# Patient Record
Sex: MALE | Race: WHITE | HISPANIC OR LATINO
[De-identification: names, ages, dates, MRNs, and addresses within clinical notes are randomized per-mention and may not be internally consistent; named-entity substitution may affect disease eponyms.]

---

## 2020-07-05 ENCOUNTER — TRANSCRIPTION ENCOUNTER (OUTPATIENT)
Age: 50
End: 2020-07-05

## 2022-12-01 ENCOUNTER — RX ONLY (RX ONLY)
Age: 52
End: 2022-12-01

## 2022-12-01 ENCOUNTER — OFFICE (OUTPATIENT)
Dept: URBAN - METROPOLITAN AREA CLINIC 28 | Facility: CLINIC | Age: 52
Setting detail: OPHTHALMOLOGY
End: 2022-12-01
Payer: COMMERCIAL

## 2022-12-01 DIAGNOSIS — H00.12: ICD-10-CM

## 2022-12-01 DIAGNOSIS — H16.221: ICD-10-CM

## 2022-12-01 PROCEDURE — 92012 INTRM OPH EXAM EST PATIENT: CPT | Performed by: OPHTHALMOLOGY

## 2022-12-01 ASSESSMENT — KERATOMETRY
OD_AXISANGLE_DEGREES: 087
OS_AXISANGLE_DEGREES: 168
OD_K1POWER_DIOPTERS: 42.50
OS_K1POWER_DIOPTERS: 43.00
OD_K2POWER_DIOPTERS: 43.50
OS_K2POWER_DIOPTERS: 43.50

## 2022-12-01 ASSESSMENT — SPHEQUIV_DERIVED
OD_SPHEQUIV: -0.625
OS_SPHEQUIV: -0.375

## 2022-12-01 ASSESSMENT — REFRACTION_AUTOREFRACTION
OS_AXIS: 071
OD_CYLINDER: -0.25
OD_AXIS: 163
OD_SPHERE: -0.50
OS_CYLINDER: -0.75
OS_SPHERE: 0.00

## 2022-12-01 ASSESSMENT — AXIALLENGTH_DERIVED
OS_AL: 23.8323
OD_AL: 24.0269

## 2022-12-01 ASSESSMENT — VISUAL ACUITY
OD_BCVA: 20/25
OS_BCVA: 20/20-1

## 2022-12-01 ASSESSMENT — LID EXAM ASSESSMENTS
OS_MEIBOMITIS: 1+
OD_MEIBOMITIS: 1+

## 2022-12-01 ASSESSMENT — TONOMETRY
OD_IOP_MMHG: 15
OS_IOP_MMHG: 18

## 2022-12-01 ASSESSMENT — SUPERFICIAL PUNCTATE KERATITIS (SPK): OD_SPK: T

## 2024-05-29 ENCOUNTER — APPOINTMENT (OUTPATIENT)
Dept: ORTHOPEDIC SURGERY | Facility: CLINIC | Age: 54
End: 2024-05-29
Payer: COMMERCIAL

## 2024-05-29 DIAGNOSIS — M75.41 IMPINGEMENT SYNDROME OF RIGHT SHOULDER: ICD-10-CM

## 2024-05-29 DIAGNOSIS — M75.81 OTHER SHOULDER LESIONS, RIGHT SHOULDER: ICD-10-CM

## 2024-05-29 PROBLEM — Z00.00 ENCOUNTER FOR PREVENTIVE HEALTH EXAMINATION: Status: ACTIVE | Noted: 2024-05-29

## 2024-05-29 PROCEDURE — 99203 OFFICE O/P NEW LOW 30 MIN: CPT

## 2024-05-29 RX ORDER — CELECOXIB 200 MG/1
200 CAPSULE ORAL TWICE DAILY
Qty: 60 | Refills: 2 | Status: ACTIVE | COMMUNITY
Start: 2024-05-29 | End: 1900-01-01

## 2024-05-29 NOTE — DISCUSSION/SUMMARY
[de-identified] : DICLOFENAC 2 X DAY 2-3 WEEKS HOME EXERCISES DAILY PT 2 X 4 WEEKS   CONSIDER KENALOG INJECTION   MRI IF NO RELIEF AFTER 12 WEEK OF TREATMENT

## 2024-05-29 NOTE — PHYSICAL EXAM
[de-identified] : PHYSICAL EXAM  RIGHT  SHOULDER - LHD   NECK EXAM  FROM NONTENDER  SPURLING  RIGHT=NEG, LEFT=NEG  NORMAL POSTURE / SCAPULAR PROTRACTION AROM 140 / 140 / 90 / 30 TENDER: SA REGION / AC JOINT / GH JOINT / BICEPS GROOVE  SPECIAL TESTING : MORALES - POSITIVE  GORGE - POSITIVE  SPEED TEST - POSITIVE  EGAN - NEGATIVE  APPREHENSION AND SUPPRESSION - NEGATIVE   RC STRENGTH TESTING  SS:  5/5 SUB 5/5 IS     5/5 BICEPS  5/5  SENSATION  - GROSSLY INTACT    [de-identified] : RIGHT SHOULDER XRAY (2 VIEWS - AP AND OUTLET) -   NO OBVIOUS FRACTURE ,  SEPARATION OR DISLOCATION  NO SIGNIFICANT OSTEOARTHRITIS, TYPE 3B ACROMION  CSA= 31

## 2024-05-29 NOTE — PROCEDURE
[de-identified] : EVALUATION AND MANAGEMENT   1- IMPINGEMENT SYNDROME  - NARROW SS OUTLET ON XRAY CONSISTENT WITH IMPINGEMENT SYNDROME  PLAN -  HOME EXERCISES DEMONSTRATED AND PROVIDED,  PROGRESS TO P.T. 2 X 4 WEEKS FOR SCAPULAR POSTURE, FLEXIBILITY AND REHAB   2 - ROTATOR CUFF TENDONITIS - TENDONITIS, BURSITIS, NO COMPLETE TEAR SEEN ON ULTRASOUND EVALUATION  PLAN - KENALOG INJECTION  ( SEE PROCEDURE NOTE )

## 2024-05-29 NOTE — HISTORY OF PRESENT ILLNESS
[de-identified] : LOCATION: RIGHT SHOULDER PAIN  DOMINANT HAND LEFT HAND  DURATION: PAIN STARTED 2 MONTHS AGO - FROM LIFTING WEIGHTS  QUALITY: SHARP  LOCALIZED ANTERIOR SA REGION CONSTANT PAIN LEVEL:3/10  LIMITED RANGE OF MOTION  TREATMENTS: PATIENT HAS TRIED REST  AGGRAVATING FACTORS: PAIN WORSENS WITH OVER HEAD , REACHING BHIND, LATERAL, APPLYING PRESSURE  PAIN WORSE AT NIGHT / SLEEPING